# Patient Record
Sex: MALE | Race: WHITE
[De-identification: names, ages, dates, MRNs, and addresses within clinical notes are randomized per-mention and may not be internally consistent; named-entity substitution may affect disease eponyms.]

---

## 2017-05-02 ENCOUNTER — HOSPITAL ENCOUNTER (OUTPATIENT)
Dept: HOSPITAL 56 - MW.CHORTHO | Age: 72
End: 2017-05-02
Attending: PHYSICIAN ASSISTANT
Payer: MEDICARE

## 2017-05-02 DIAGNOSIS — M75.41: ICD-10-CM

## 2017-05-02 DIAGNOSIS — M75.42: Primary | ICD-10-CM

## 2017-05-02 PROCEDURE — G0463 HOSPITAL OUTPT CLINIC VISIT: HCPCS

## 2018-05-03 ENCOUNTER — HOSPITAL ENCOUNTER (INPATIENT)
Dept: HOSPITAL 56 - MW.MS | Age: 73
LOS: 2 days | Discharge: NURSING, REHAB OR LONG-TERM CARE | DRG: 683 | End: 2018-05-05
Attending: INTERNAL MEDICINE | Admitting: INTERNAL MEDICINE
Payer: MEDICARE

## 2018-05-03 DIAGNOSIS — Z88.5: ICD-10-CM

## 2018-05-03 DIAGNOSIS — I25.5: ICD-10-CM

## 2018-05-03 DIAGNOSIS — I25.10: ICD-10-CM

## 2018-05-03 DIAGNOSIS — R07.89: ICD-10-CM

## 2018-05-03 DIAGNOSIS — I48.2: ICD-10-CM

## 2018-05-03 DIAGNOSIS — R10.30: ICD-10-CM

## 2018-05-03 DIAGNOSIS — R60.9: ICD-10-CM

## 2018-05-03 DIAGNOSIS — Z79.899: ICD-10-CM

## 2018-05-03 DIAGNOSIS — E78.5: ICD-10-CM

## 2018-05-03 DIAGNOSIS — R79.89: ICD-10-CM

## 2018-05-03 DIAGNOSIS — I50.22: ICD-10-CM

## 2018-05-03 DIAGNOSIS — R06.00: ICD-10-CM

## 2018-05-03 DIAGNOSIS — R07.81: ICD-10-CM

## 2018-05-03 DIAGNOSIS — N17.9: Primary | ICD-10-CM

## 2018-05-03 DIAGNOSIS — I10: ICD-10-CM

## 2018-05-03 DIAGNOSIS — Z95.1: ICD-10-CM

## 2018-05-03 DIAGNOSIS — J90: ICD-10-CM

## 2018-05-03 DIAGNOSIS — I42.0: ICD-10-CM

## 2018-05-03 DIAGNOSIS — I44.7: ICD-10-CM

## 2018-05-03 DIAGNOSIS — K21.9: ICD-10-CM

## 2018-05-03 DIAGNOSIS — Z79.82: ICD-10-CM

## 2018-05-03 DIAGNOSIS — F41.8: ICD-10-CM

## 2018-05-03 LAB
CHLORIDE SERPL-SCNC: 99 MMOL/L (ref 98–107)
SODIUM SERPL-SCNC: 138 MMOL/L (ref 136–148)

## 2018-05-03 PROCEDURE — C1729 CATH, DRAINAGE: HCPCS

## 2018-05-03 RX ADMIN — POTASSIUM CHLORIDE SCH MEQ: 1500 TABLET, EXTENDED RELEASE ORAL at 18:19

## 2018-05-03 RX ADMIN — ONDANSETRON PRN MG: 2 INJECTION, SOLUTION INTRAMUSCULAR; INTRAVENOUS at 23:52

## 2018-05-03 RX ADMIN — POTASSIUM CHLORIDE SCH: 1500 TABLET, EXTENDED RELEASE ORAL at 21:44

## 2018-05-03 RX ADMIN — SODIUM CHLORIDE PRN MG: 9 INJECTION, SOLUTION INTRAVENOUS at 21:45

## 2018-05-03 NOTE — PCM.HP
H&P History of Present Illness





- General


Date of Service: 05/03/18


Admit Problem/Dx: 


 Admission Diagnosis/Problem





Admission Diagnosis/Problem      Acute kidney injury








Source of Information: Patient, Family (wife at bedside), Old Records (Dr Moon clinic records.)


History Limitations: Reports: No Limitations





- History of Present Illness


Initial Comments - Free Text/Narative: 





This 72 year old male with pmh of CAD, afib with LBBB, CABG 2/2018 with post 

CABG EF of 40%, systolic CHF and elevated RV pressures and large L sided 

pleural effusion was admitted directly from Dr Moon's clinic secondary 

to ELEONORA and elevated LFTs. He also reports L sided and mid sternal rib cage pain

, which has been present since CABG in February. It worsens with coughing and 

any movement of his trunk or palpation of his chest. Sharp in nature. The 

dyspnea has worsened over the last week and is now unable to walk short 

distances. He was switched from Lasix to Bumex due to ineffectiveness and some 

hearing changes. He feels very dry, with dry mouth and throat. He denies 

lightheadedness, dizziness or palpitations, no abdominal pain or troubles 

urinating. No black or bloody BMs. No fevers recently. Cough produces only 

clear phlegm.  





Labwork obtained 5/2 revealed BUN 22 and Cr 2.2, Na 143, K+ 2.9, , AST 

142 and Alk phos 235, . CXR obtained today 5/3 revealed moderate to 

large L sided pleural effusion, unchanged from 4/23/18.  





Upon arrival to Med/Surg HR ausculated in 150s. EKG obtained which revealed 

Afib rates 130 with LBBB. he denies palpitations or feeling of HR elevated. No 

dizziness. 





- Related Data


Allergies/Adverse Reactions: 


 Allergies











Allergy/AdvReac Type Severity Reaction Status Date / Time


 


No Known Allergies Allergy   Verified 05/03/18 17:55











Home Medications: 


 Home Meds





Aspirin 81 mg PO DAILY 05/03/18 [History]


ClonazePAM [KlonoPIN] 0.5 mg PO BEDTIME 05/03/18 [History]


Pantoprazole Sodium 40 mg PO DAILY 05/03/18 [History]


traMADol HCl [Tramadol HCl] 50 mg PO TID PRN 05/03/18 [History]











Past Medical History


Cardiovascular History: Reports: Afib, Bypass (2/2018), CAD, Heart Failure, 

High Cholesterol, Hypertension


Respiratory History: Reports: SOB (exertional)


Gastrointestinal History: Reports: GERD


Genitourinary History: Reports: None


Musculoskeletal History: Reports: Arthritis


Psychiatric History: Reports: Anxiety


Endocrine/Metabolic History: Denies: Diabetes, Type II


Hematologic History: Reports: Anticoagulation Therapy (Xarelto)





- Past Surgical History


Cardiovascular Surgical History: Reports: Cardiac Ablation, Coronary Artery 

Bypass





Social & Family History





- Tobacco Use


Smoking Status *Q: Never Smoker





- Alcohol Use


Alcohol Use History: No





- Recreational Drug Use


Recreational Drug Use: No





- Living Situation & Occupation


Living situation: Reports: 


Occupation: Retired





H&P Review of Systems





- Review of Systems:


Review Of Systems: See Below


General: Reports: Malaise, Fatigue.  Denies: Fever, Chills


HEENT: Reports: No Symptoms.  Denies: Headaches, Sinus Congestion, Sore Throat, 

Vertigo


Pulmonary: Reports: Shortness of Breath, Cough, Sputum (clear).  Denies: 

Hemoptysis


Cardiovascular: Reports: Chest Pain (with movement of trunk and coughing to 

bilateral rib cage), Dyspnea on Exertion, Edema (BLE).  Denies: Palpitations


Gastrointestinal: Reports: No Symptoms.  Denies: Abdominal Pain, Black Stool, 

Bloody Stool, Diarrhea, Nausea, Vomiting


Genitourinary: Reports: No Symptoms.  Denies: Dysuria, Frequency, Burning, Pain


Musculoskeletal: Reports: No Symptoms.  Denies: Neck Pain


Skin: Reports: No Symptoms.  Denies: Erythema


Psychiatric: Reports: Anxiety


Neurological: Reports: No Symptoms


Hematologic/Lymphatic: Reports: No Symptoms


Immunologic: Reports: No Symptoms





Exam





- Exam


Exam: See Below





- Exam


Quality Assessment: DVT Prophylaxis (SCDs only)


General: Alert, Oriented, Cooperative


HEENT: Conjunctiva Clear, Pupils Reactive.  No: Mucosa Moist & Pink (dry)


Neck: Supple, Trachea Midline, 2


Lungs: Decreased Breath Sounds (to L lower lobe)


Cardiovascular: Normal S1, Normal S2, Irregular Rhythm, Tachycardia (rates 150s)


GI/Abdominal Exam: Normal Bowel Sounds, Soft, Non-Tender, No Organomegaly, No 

Distention, No Abnormal Bruit, No Mass, Pelvis Stable


Extremities: Normal Inspection, Normal Range of Motion, Pedal Edema (+2-3 

pitting edema extending from bilateral hips to feet)


Skin: Other (well healed sternal incision and chest tube sites. this is where 

chest pain is located and hurts with palpation.)


Neurological: Cranial Nerves Intact


Neuro Extensive - Mental Status: Alert, Oriented x3, Normal Mood/Affect, Normal 

Cognition


Neuro Extensive - Motor, Sensory, Reflexes: CN II-XII Intact


Psychiatric: Alert, Anxious





- Patient Data


Result Diagrams: 


 05/03/18 17:00





 05/03/18 17:00





EKG INTERPRETATION


EKG Date: 05/03/18


Rhythm: A-Fib


Rate (Beats/Min): 130


QRS: LBBB


Comparison: No Change





*Q Meaningful Use (ADM)





- VTE Risk Assess *Q


Each Risk Factor Represents 1 Point: Congestive heart failure (CHF)


Total Score 1 Point Risk Factors: 1


Each Risk Factor Represents 2 Points: Age 60 - 74 Years


Total Score 2 Point Risk Factors: 2


Each Risk Factor Represents 3 Points: None


Total Score 3 Point Risk Factors: 0


Each Risk Factor Represents 5 Points: None


Total Score 5 Point Risk Factors: 0


Venous Thromboembolism Risk Factor Score *Q: 3





- Problem List


(1) ELEONORA (acute kidney injury)


SNOMED Code(s): 29240138


   ICD Code: N17.9 - ACUTE KIDNEY FAILURE, UNSPECIFIED   Status: Acute   

Current Visit: Yes   





(2) Chronic atrial fibrillation with RVR


SNOMED Code(s): 750537765, 451462924758413


   ICD Code: I48.2 - CHRONIC ATRIAL FIBRILLATION   Status: Acute   Current Visit

: Yes   





(3) Dyspnea


SNOMED Code(s): 393250085


   ICD Code: R06.00 - DYSPNEA, UNSPECIFIED   Status: Acute   Current Visit: Yes

   





(4) Pleural effusion


SNOMED Code(s): 01783170


   ICD Code: J90 - PLEURAL EFFUSION, NOT ELSEWHERE CLASSIFIED   Status: Acute   

Current Visit: Yes   





(5) Elevated LFTs


SNOMED Code(s): 633184636, 295622147


   ICD Code: R79.89 - OTHER SPECIFIED ABNORMAL FINDINGS OF BLOOD CHEMISTRY   

Status: Acute   Current Visit: Yes   





(6) Anxiety


SNOMED Code(s): 17363429


   ICD Code: F41.9 - ANXIETY DISORDER, UNSPECIFIED   Status: Chronic   Current 

Visit: Yes   





(7) CAD (coronary artery disease)


SNOMED Code(s): 10170277


   ICD Code: I25.10 - ATHSCL HEART DISEASE OF NATIVE CORONARY ARTERY W/O ANG 

PCTRS   Status: Chronic   Current Visit: Yes   





(8) Hx of CABG


SNOMED Code(s): 192559425, 718829397


   ICD Code: Z95.1 - PRESENCE OF AORTOCORONARY BYPASS GRAFT   Status: Chronic   

Current Visit: Yes   





(9) Depression


SNOMED Code(s): 22624943


   ICD Code: F32.9 - MAJOR DEPRESSIVE DISORDER, SINGLE EPISODE, UNSPECIFIED   

Status: Chronic   Current Visit: Yes   





(10) Chronic a-fib


SNOMED Code(s): 824770953


   ICD Code: I48.2 - CHRONIC ATRIAL FIBRILLATION   Status: Acute   Current Visit

: Yes   





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


SNOMED Code(s): 394200008


   ICD Code: I25.5 - ISCHEMIC CARDIOMYOPATHY; I42.0 - DILATED CARDIOMYOPATHY   

Status: Chronic   Current Visit: Yes   





(12) Musculoskeletal chest pain


SNOMED Code(s): 289745279


   ICD Code: R07.89 - OTHER CHEST PAIN   Status: Chronic   Current Visit: Yes   





(13) HTN (hypertension)


SNOMED Code(s): 67074198


   ICD Code: I10 - ESSENTIAL (PRIMARY) HYPERTENSION   Status: Chronic   Current 

Visit: Yes   


Qualifiers: 


   Hypertension type: essential hypertension   Qualified Code(s): I10 - 

Essential (primary) hypertension   





(14) CHF (congestive heart failure)


SNOMED Code(s): 28302415


   ICD Code: I50.9 - HEART FAILURE, UNSPECIFIED   Status: Chronic   Current 

Visit: Yes   


Qualifiers: 


   Heart failure type: systolic   Heart failure chronicity: chronic   Qualified 

Code(s): I50.22 - Chronic systolic (congestive) heart failure   


Problem List Initiated/Reviewed/Updated: Yes


Orders Last 24hrs: 


 Active Orders 24 hr











 Category Date Time Status


 


 Patient Status [ADT] Routine ADT  05/03/18 16:23 Ordered


 


 EKG 12 Lead [EKG Documentation Completion] [RC] STAT Care  05/03/18 17:01 

Ordered


 


 Height and Weight [RC] DAILY Care  05/03/18 16:23 Ordered


 


 Intake and Output Strict [RC] ASDIRECTED Care  05/03/18 16:32 Ordered


 


 Intake and Output [RC] QSHIFT Care  05/03/18 16:24 Inactive


 


 Oxygen Therapy [RC] PRN Care  05/03/18 16:23 Ordered


 


 RT Aerosol Therapy [RC] ASDIRECTED Care  05/03/18 16:30 Ordered


 


 Telemetry Monitoring [Cardiac Monitoring] [RC] .AS Care  05/03/18 17:02 Ordered





 DIRECTED   


 


 Up With Assistance [RC] ASDIRECTED Care  05/03/18 16:23 Ordered


 


 VTE/DVT Education [RC] PER UNIT ROUTINE Care  05/03/18 16:23 Ordered


 


 Vital Signs [RC] Q4H Care  05/03/18 16:23 Ordered


 


 2 Gram Sodium Diet [DIET] Diet  05/03/18 Dinner Ordered


 


 Chest wo Cont [CT] Urgent Exams  05/03/18 16:23 Ordered


 


 Echo Comp wo Cont [US] Routine Exams  05/03/18 16:23 Ordered


 


 Thoracentesis W/ US Guide [US] Routine Exams  05/03/18 17:03 Ordered


 


 CBC WITH AUTO DIFF [HEME] Routine Lab  05/03/18 16:23 Ordered


 


 COMPREHENSIVE METABOLIC PN,CMP [CHEM] Routine Lab  05/03/18 16:23 Ordered


 


 Albuterol [Proventil Neb Soln] Med  05/03/18 16:23 Ordered





 2.5 mg NEB Q2H PRN   


 


 Metoprolol Tartrate [Lopressor] Med  05/03/18 17:00 Ordered





 25 mg PO Q12HR   


 


 Ondansetron [Zofran] Med  05/03/18 16:23 Ordered





 4 mg IVPUSH Q4H PRN   


 


 Sodium Chloride 0.9% [Normal Saline] 1,000 ml Med  05/03/18 16:30 Ordered





 IV ASDIRECTED   


 


 Sodium Chloride 0.9% [Saline Flush] Med  05/03/18 16:23 Ordered





 2.5 ml FLUSH ASDIRECTED PRN   


 


 oxyCODONE Med  05/03/18 16:59 Ordered





 5 mg PO Q6H PRN   


 


 Saline Lock Insert [OM.PC] Routine Oth  05/03/18 16:23 Ordered


 


 Sequential Compression Device [OM.PC] Per Unit Routine Oth  05/03/18 16:24 

Ordered


 


 Resuscitation Status Routine Resus Stat  05/03/18 17:03 Ordered








 Medication Orders





Albuterol (Proventil Neb Soln)  2.5 mg NEB Q2H PRN


   PRN Reason: Shortness Of Breath/wheezing


Sodium Chloride (Normal Saline)  1,000 mls @ 75 mls/hr IV ASDIRECTED SHIRA


Metoprolol Tartrate (Lopressor)  25 mg PO Q12HR SHIRA


Ondansetron HCl (Zofran)  4 mg IVPUSH Q4H PRN


   PRN Reason: Nausea


Oxycodone HCl (Oxycodone)  5 mg PO Q6H PRN


   PRN Reason: Pain


Sodium Chloride (Saline Flush)  2.5 ml FLUSH ASDIRECTED PRN


   PRN Reason: Keep Vein Open








Assessment/Plan Comment:: 





This 72 year old male admitted with ELEONORA secondary to diuretic use with pmh of 

CAD, CHF, Afib, and L sided pleural effusion





1. ELEONORA: likely secondary to over diuresis. Will hold Bumex. Gentle hydration 

with NS 75. BUN 22 Cr 2.4, baseline near 1.3.





2. Chronic Afib with RVR: HR on admission elevated 130-150s, asymptomatic. 

Spoke with Dr Moon. Recommended Lopressor, wife reports he had 

reaction to this and it was stopped, unknown reaction, will check records at 

[place]. Consulted with Dr NADINE lewis, Ok to give Diltiazem 10 mg IV PRN and 

recommended to start Coreg 3.125 mg BID tonight. Monitor on telemetry. K+ 2.6, 

will give 40 po now and 40 IV in 500 ml NS this evening, Magnesium pending. 

Holding Xarelto due to elevated LFTs. HR improved to 80s after IV Diltiazem. 





3. CHF: EF 40% post CABG. Will obtain new ECHO during admission. Continues to 

have BLE edema, but very intravascularly dry. 





4. Dyspnea/L sided pleural effusion: Will have thoracentesis tomorrow. Chest CT 

without contrast this evening, pending. 





5. Elevated LFTs: Hold Crestor and Xarelto for now. Will monitor daily. 

, , Bilirubin 1.2 and Alk phos 240. 





6. Anxiety/Depression: Continue Clonazepam. Monitor





7. CAD: Stable: ECHO pending. Holding Statin due to elevated LFTs. Hx CABG. 

Will monitor.





8. Musculoskeletal chest pain: Secondary to CABG, reports Tramadol not working 

at home. Unable to give NSAIDs or Tylenol. Will trial Oxycodone 5 mg and 

monitor. patient and wife request NO Morphine.





VTE prophylaxis: Holding Xarelto. SCDs for now. 





Dispo: 2-3 days pending improvement.

## 2018-05-04 LAB
CHLORIDE SERPL-SCNC: 102 MMOL/L (ref 98–107)
SODIUM SERPL-SCNC: 139 MMOL/L (ref 136–148)

## 2018-05-04 PROCEDURE — 0W9B3ZZ DRAINAGE OF LEFT PLEURAL CAVITY, PERCUTANEOUS APPROACH: ICD-10-PCS

## 2018-05-04 RX ADMIN — POTASSIUM CHLORIDE SCH MEQ: 1500 TABLET, EXTENDED RELEASE ORAL at 09:09

## 2018-05-04 RX ADMIN — ONDANSETRON PRN MG: 2 INJECTION, SOLUTION INTRAMUSCULAR; INTRAVENOUS at 09:38

## 2018-05-04 RX ADMIN — ONDANSETRON PRN MG: 2 INJECTION, SOLUTION INTRAMUSCULAR; INTRAVENOUS at 18:10

## 2018-05-04 RX ADMIN — POTASSIUM CHLORIDE SCH MEQ: 1500 TABLET, EXTENDED RELEASE ORAL at 20:11

## 2018-05-04 RX ADMIN — ONDANSETRON PRN MG: 2 INJECTION, SOLUTION INTRAMUSCULAR; INTRAVENOUS at 05:30

## 2018-05-04 NOTE — US
EXAMINATION: Ultrasound guided left thoracentesis.

 

HISTORY: Left pleural effusion. 

 

Technique/findings:  The procedure, benefits and risks were discussed with the patient. Risks include
d serious bleeding, infection and pain. An adequate location was located within the left posterior lo
wer thorax. The overlying area was sterilely prepped and draped. 1% lidocaine was administered for lo
elan anesthesia. Using guidance and care to place a needle on the superior aspect of the adjacent rib 
a 5 Faro[place] one-step needle was advanced until fluid return was noted. Approximately 1000 mL of bloody
 fluid was collected. The patient did have a considerable amount of coughing following the procedure.
 Which did somewhat improved from before returning to the floor.

 

 

IMPRESSION: Successful ultrasound guided left thoracentesis. Fluid was bloody.

## 2018-05-04 NOTE — PCM.PN
- General Info


Date of Service: 05/04/18


Admission Dx/Problem (Free Text): 


 Admission Diagnosis/Problem





Admission Diagnosis/Problem      Acute kidney injury








Subjective Update: 





Feeling "so-so" this morning. musculoskeletal chest pain is better controlled 

with Oxycodone. Coughing has decreased slightly since pain controlled better as 

well. Feeling a little more hydrated this morning, mouth is less dry.  


Functional Status: Reports: Pain Controlled, Tolerating Diet, Urinating





- Review of Systems


General: Reports: Malaise


HEENT: Denies: Headaches, Sore Throat, Visual Changes


Pulmonary: Reports: Shortness of Breath, Cough, Sputum (clear).  Denies: 

Wheezing


Cardiovascular: Reports: Chest Pain (musculoskeletal), Edema


Gastrointestinal: Reports: Decreased Appetite, Nausea (intermittent).  Denies: 

Abdominal Pain, Vomiting


Genitourinary: Reports: No Symptoms.  Denies: Dysuria, Frequency


Musculoskeletal: Reports: No Symptoms.  Denies: Neck Pain


Skin: Reports: No Symptoms


Neurological: Reports: No Symptoms


Psychiatric: Reports: Anxiety





- Patient Data


Vitals - Most Recent: 


 Last Vital Signs











Temp  98.7 F   05/04/18 04:00


 


Pulse  95   05/04/18 04:00


 


Resp  22 H  05/04/18 04:00


 


BP  127/90   05/04/18 04:00


 


Pulse Ox  95   05/04/18 04:00











Weight - Most Recent: 69 kg


I&O - Last 24 Hours: 


 Intake & Output











 05/03/18 05/04/18 05/04/18





 22:59 06:59 14:59


 


Intake Total  790 


 


Output Total  150 


 


Balance  640 











Lab Results Last 24 Hours: 


 Laboratory Results - last 24 hr











  05/03/18 05/03/18 05/03/18 Range/Units





  17:00 17:00 17:00 


 


WBC  10.44    (4.0-11.0)  K/uL


 


RBC  4.75    (4.50-5.90)  M/uL


 


Hgb  13.8    (13.0-17.0)  g/dL


 


Hct  42.4    (38.0-50.0)  %


 


MCV  89.3    (80.0-98.0)  fL


 


MCH  29.1    (27.0-32.0)  pg


 


MCHC  32.5    (31.0-37.0)  g/dL


 


RDW Std Deviation  55.2    (28.0-62.0)  fl


 


RDW Coeff of Aj  17 H    (11.0-15.0)  %


 


Plt Count  220    (150-400)  K/uL


 


MPV  9.60    (7.40-12.00)  fL


 


Neut % (Auto)  70.2    (48.0-80.0)  %


 


Lymph % (Auto)  15.0 L    (16.0-40.0)  %


 


Mono % (Auto)  14.5    (0.0-15.0)  %


 


Eos % (Auto)  0.1    (0.0-7.0)  %


 


Baso % (Auto)  0.2    (0.0-1.5)  %


 


Neut # (Auto)  7.3 H    (1.4-5.7)  K/uL


 


Lymph # (Auto)  1.6    (0.6-2.4)  K/uL


 


Mono # (Auto)  1.5 H    (0.0-0.8)  K/uL


 


Eos # (Auto)  0.0    (0.0-0.7)  K/uL


 


Baso # (Auto)  0.0    (0.0-0.1)  K/uL


 


Add Manual Diff     


 


Neutrophils % (Manual)     (48.0-80.0)  %


 


Lymphocytes % (Manual)     (16.0-40.0)  %


 


Monocytes % (Manual)     (0.0-15.0)  %


 


Basophils % (Manual)     (0.0-1.5)  %


 


Nucleated RBC %  0.0    /100WBC


 


Absolute Seg Neuts     (1.4-5.7)  


 


Lymphocytes # (Manual)     (0.6-2.4)  


 


Monocytes # (Manual)     (0.0-0.8)  


 


Basophils # (Manual)     (0.0-0.1)  


 


Nucleated RBCs #  0    K/uL


 


Sodium   138   (136-148)  mmol/L


 


Potassium   2.6 L   (3.5-5.1)  mmol/L


 


Chloride   99   ()  mmol/L


 


Carbon Dioxide   23.5   (21.0-32.0)  mmol/L


 


BUN   22 H   (7.0-18.0)  mg/dL


 


Creatinine   2.4 H   (0.8-1.3)  mg/dL


 


Est Cr Clr Drug Dosing   TNP   


 


Estimated GFR (MDRD)   26.7   ml/min


 


Glucose   110 H   ()  mg/dL


 


Calcium   9.4   (8.5-10.1)  mg/dL


 


Magnesium    1.6  (1.5-2.0)  mg/dL


 


Total Bilirubin   1.2 H   (0.2-1.0)  mg/dL


 


AST   266 H   (15-37)  IU/L


 


ALT   211 H   (14-63)  IU/L


 


Alkaline Phosphatase   240 H   ()  U/L


 


Total Protein   7.1   (6.4-8.2)  g/dL


 


Albumin   3.2 L   (3.4-5.0)  g/dL


 


Globulin   3.9 H   (2.0-3.5)  g/dL


 


Albumin/Globulin Ratio   0.8 L   (1.3-2.8)  














  05/04/18 05/04/18 Range/Units





  05:05 05:05 


 


WBC  8.73   (4.0-11.0)  K/uL


 


RBC  4.59   (4.50-5.90)  M/uL


 


Hgb  13.4   (13.0-17.0)  g/dL


 


Hct  41.5   (38.0-50.0)  %


 


MCV  90.4   (80.0-98.0)  fL


 


MCH  29.2   (27.0-32.0)  pg


 


MCHC  32.3   (31.0-37.0)  g/dL


 


RDW Std Deviation  55.8   (28.0-62.0)  fl


 


RDW Coeff of Aj  17 H   (11.0-15.0)  %


 


Plt Count  197   (150-400)  K/uL


 


MPV  9.80   (7.40-12.00)  fL


 


Neut % (Auto)    (48.0-80.0)  %


 


Lymph % (Auto)    (16.0-40.0)  %


 


Mono % (Auto)    (0.0-15.0)  %


 


Eos % (Auto)    (0.0-7.0)  %


 


Baso % (Auto)    (0.0-1.5)  %


 


Neut # (Auto)    (1.4-5.7)  K/uL


 


Lymph # (Auto)    (0.6-2.4)  K/uL


 


Mono # (Auto)    (0.0-0.8)  K/uL


 


Eos # (Auto)    (0.0-0.7)  K/uL


 


Baso # (Auto)    (0.0-0.1)  K/uL


 


Add Manual Diff  YES   


 


Neutrophils % (Manual)  61   (48.0-80.0)  %


 


Lymphocytes % (Manual)  22   (16.0-40.0)  %


 


Monocytes % (Manual)  16 H   (0.0-15.0)  %


 


Basophils % (Manual)  1   (0.0-1.5)  %


 


Nucleated RBC %  0.0   /100WBC


 


Absolute Seg Neuts  5.3   (1.4-5.7)  


 


Lymphocytes # (Manual)  1.9   (0.6-2.4)  


 


Monocytes # (Manual)  1.4 H   (0.0-0.8)  


 


Basophils # (Manual)  0.1   (0.0-0.1)  


 


Nucleated RBCs #  0   K/uL


 


Sodium   139  (136-148)  mmol/L


 


Potassium   3.7  (3.5-5.1)  mmol/L


 


Chloride   102  ()  mmol/L


 


Carbon Dioxide   25.5  (21.0-32.0)  mmol/L


 


BUN   26 H  (7.0-18.0)  mg/dL


 


Creatinine   2.4 H  (0.8-1.3)  mg/dL


 


Est Cr Clr Drug Dosing   23.30  


 


Estimated GFR (MDRD)   26.7  ml/min


 


Glucose   111 H  ()  mg/dL


 


Calcium   9.0  (8.5-10.1)  mg/dL


 


Magnesium   2.4 H  (1.5-2.0)  mg/dL


 


Total Bilirubin   1.1 H  (0.2-1.0)  mg/dL


 


AST   483 H  (15-37)  IU/L


 


ALT   331 H  (14-63)  IU/L


 


Alkaline Phosphatase   205 H  ()  U/L


 


Total Protein   6.4  (6.4-8.2)  g/dL


 


Albumin   2.8 L  (3.4-5.0)  g/dL


 


Globulin   3.6 H  (2.0-3.5)  g/dL


 


Albumin/Globulin Ratio   0.8 L  (1.3-2.8)  











Med Orders - Current: 


 Current Medications





Albuterol (Proventil Neb Soln)  2.5 mg NEB Q2H PRN


   PRN Reason: Shortness Of Breath/wheezing


Carvedilol (Coreg)  3.125 mg PO BID [facility]


   Last Admin: 05/03/18 21:44 Dose:  3.125 mg


Clonazepam (Klonopin)  0.5 mg PO BEDTIME [facility]IR[facility]


   Last Admin: 05/03/18 21:44 Dose:  0.5 mg


Diltiazem HCl (Diltiazem)  10 mg IVPUSH Q3H PRN


   PRN Reason: afib HR>110


   Last Admin: 05/03/18 21:45 Dose:  10 mg


Sodium Chloride (Normal Saline)  1,000 mls @ 75 mls/hr IV ASDIRECTED [facility]


   Last Admin: 05/04/18 05:12 Dose:  75 mls/hr


Ondansetron HCl (Zofran)  4 mg IVPUSH Q4H PRN


   PRN Reason: Nausea


   Last Admin: 05/04/18 05:30 Dose:  4 mg


Oxycodone HCl (Oxycodone)  5 mg PO Q6H PRN


   PRN Reason: Pain


   Last Admin: 05/04/18 05:32 Dose:  5 mg


Pantoprazole Sodium (Protonix***)  40 mg PO DAILY@0700 [facility]


   Last Admin: 05/04/18 06:54 Dose:  40 mg


Potassium Chloride (Klor-Con M20)  40 meq PO BID [facility]


   Last Admin: 05/03/18 21:44 Dose:  Not Given


Sodium Chloride (Saline Flush)  2.5 ml FLUSH ASDIRECTED PRN


   PRN Reason: Keep Vein Open





Discontinued Medications





Diltiazem HCl (Diltiazem)  10 mg IVPUSH ONETIME ONE


   Stop: 05/03/18 17:19


   Last Admin: 05/03/18 17:38 Dose:  10 mg


Diltiazem HCl (Diltiazem)  10 mg IVPUSH Q6H PRN


   PRN Reason: afib HR>110


Potassium Chloride 40 meq/ (Sodium Chloride)  520 mls @ 100 mls/hr IV .ONETIME 

[facility]


   Last Admin: 05/03/18 18:20 Dose:  100 mls/hr


Magnesium Sulfate 2 gm/ Premix  50 mls @ 50 mls/hr IV ONETIME ONE


   Stop: 05/03/18 19:08


   Last Admin: 05/03/18 18:21 Dose:  50 mls/hr


Metoprolol Tartrate (Lopressor)  5 mg IVPUSH STAT ONE


   Stop: 05/03/18 16:59


   Last Admin: 05/03/18 17:38 Dose:  Not Given


Metoprolol Tartrate (Lopressor)  25 mg PO Q12HR [facility]


   Last Admin: 05/03/18 17:39 Dose:  Not Given











- Exam


General: Alert, Oriented, Cooperative, No Acute Distress


Lungs: Clear to Auscultation, Normal Respiratory Effort


Cardiovascular: Regular Rate, Irregular Rhythm


GI/Abdominal Exam: Normal Bowel Sounds, Soft, Non-Tender, No Organomegaly, No 

Distention, No Abnormal Bruit, No Mass, Pelvis Stable


Back Exam: Normal Inspection, Full Range of Motion


Extremities: Normal Inspection, Normal Range of Motion, Non-Tender, Normal 

Capillary Refill, Pedal Edema (+3 pitting edema to BLE extending up to 

bilateral hips)


Neurological: No New Focal Deficit


Psy/Mental Status: Alert, Normal Mood, Anxious (intermittently)





- Problem List & Annotations


(1) ELEONORA (acute kidney injury)


SNOMED Code(s): 80168035


   Code(s): N17.9 - ACUTE KIDNEY FAILURE, UNSPECIFIED   Status: Acute   Current 

Visit: Yes   





(2) Chronic atrial fibrillation with RVR


SNOMED Code(s): 735969644, 456159132460461


   Code(s): I48.2 - CHRONIC ATRIAL FIBRILLATION   Status: Acute   Current Visit

: Yes   





(3) Dyspnea


SNOMED Code(s): 377800936


   Code(s): R06.00 - DYSPNEA, UNSPECIFIED   Status: Acute   Current Visit: Yes 

  





(4) Pleural effusion


SNOMED Code(s): 98954070


   Code(s): J90 - PLEURAL EFFUSION, NOT ELSEWHERE CLASSIFIED   Status: Acute   

Current Visit: Yes   





(5) Elevated LFTs


SNOMED Code(s): 923885625, 494717158


   Code(s): R79.89 - OTHER SPECIFIED ABNORMAL FINDINGS OF BLOOD CHEMISTRY   

Status: Acute   Current Visit: Yes   





(6) Anxiety


SNOMED Code(s): 25607170


   Code(s): F41.9 - ANXIETY DISORDER, UNSPECIFIED   Status: Chronic   Current 

Visit: Yes   





(7) CAD (coronary artery disease)


SNOMED Code(s): 02023685


   Code(s): I25.10 - ATHSCL HEART DISEASE OF NATIVE CORONARY ARTERY W/O ANG 

PCTRS   Status: Chronic   Current Visit: Yes   





(8) Hx of CABG


SNOMED Code(s): 146371785, 835306335


   Code(s): Z95.1 - PRESENCE OF AORTOCORONARY BYPASS GRAFT   Status: Chronic   

Current Visit: Yes   





(9) Depression


SNOMED Code(s): 37449253


   Code(s): F32.9 - MAJOR DEPRESSIVE DISORDER, SINGLE EPISODE, UNSPECIFIED   

Status: Chronic   Current Visit: Yes   





(10) Chronic a-fib


SNOMED Code(s): 434239328


   Code(s): I48.2 - CHRONIC ATRIAL FIBRILLATION   Status: Acute   Current Visit

: Yes   





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


SNOMED Code(s): 053784065


   Code(s): I25.5 - ISCHEMIC CARDIOMYOPATHY; I42.0 - DILATED CARDIOMYOPATHY   

Status: Chronic   Current Visit: Yes   





(12) Musculoskeletal chest pain


SNOMED Code(s): 830355986


   Code(s): R07.89 - OTHER CHEST PAIN   Status: Chronic   Current Visit: Yes   





(13) HTN (hypertension)


SNOMED Code(s): 06685734


   Code(s): I10 - ESSENTIAL (PRIMARY) HYPERTENSION   Status: Chronic   Current 

Visit: Yes   


Qualifiers: 


   Hypertension type: essential hypertension   Qualified Code(s): I10 - 

Essential (primary) hypertension   





(14) CHF (congestive heart failure)


SNOMED Code(s): 05643610


   Code(s): I50.9 - HEART FAILURE, UNSPECIFIED   Status: Chronic   Current Visit

: Yes   


Qualifiers: 


   Heart failure type: systolic   Heart failure chronicity: chronic   Qualified 

Code(s): I50.22 - Chronic systolic (congestive) heart failure   





- Problem List Review


Problem List Initiated/Reviewed/Updated: Yes





- My Orders


Last 24 Hours: 


My Active Orders





05/03/18 16:23


Patient Status [ADT] Routine 


Height and Weight [RC] DAILY 


Oxygen Therapy [RC] PRN 


Up With Assistance [RC] ASDIRECTED 


Vital Signs [RC] Q4H 


Chest wo Cont [CT] Urgent 


Albuterol [Proventil Neb Soln]   2.5 mg NEB Q2H PRN 


Ondansetron [Zofran]   4 mg IVPUSH Q4H PRN 


Sodium Chloride 0.9% [Saline Flush]   2.5 ml FLUSH ASDIRECTED PRN 


Saline Lock Insert [OM.PC] Routine 





05/03/18 16:24


Intake and Output [RC] QSHIFT 


Sequential Compression Device [OM.PC] Per Unit Routine 





05/03/18 16:30


RT Aerosol Therapy [RC] ASDIRECTED 


Sodium Chloride 0.9% [Normal Saline] 1,000 ml IV ASDIRECTED 





05/03/18 16:32


Intake and Output Strict [RC] ASDIRECTED 





05/03/18 16:59


oxyCODONE   5 mg PO Q6H PRN 





05/03/18 17:02


Telemetry Monitoring [Cardiac Monitoring] [RC] Q8H 





05/03/18 17:03


Resuscitation Status Routine 





05/03/18 18:11


BERENICE Hose [Antiembolic Hose] [OM.PC] Routine 





05/03/18 18:15


Potassium Chloride [Klor-Con M20]   40 meq PO BID 





05/03/18 21:00


Carvedilol [Coreg]   3.125 mg PO BID 


ClonazePAM [KlonoPIN]   0.5 mg PO BEDTIME 





05/03/18 Dinner


2 Gram Sodium Diet [DIET] 





05/04/18


Thoracentesis W/ US Guide [US] Routine 





05/04/18 07:00


Pantoprazole [ProTONIX***]   40 mg PO DAILY@0700 





05/04/18 08:05


Communication Order [RC] PRN 





05/04/18 16:23


Echo Comp wo Cont [US] Routine 





05/05/18 05:11


CBC WITH AUTO DIFF [HEME] AM 


COMPREHENSIVE METABOLIC PN,CMP [CHEM] AM 


MG [MAGNESIUM] [CHEM] AM 





05/06/18 05:11


CBC WITH AUTO DIFF [HEME] AM 


COMPREHENSIVE METABOLIC PN,CMP [CHEM] AM 


MG [MAGNESIUM] [CHEM] AM 





05/07/18 05:11


CBC WITH AUTO DIFF [HEME] AM 


COMPREHENSIVE METABOLIC PN,CMP [CHEM] AM 


MG [MAGNESIUM] [CHEM] AM 














- Plan


Plan:: 





This 72 year old male admitted with ELEONORA secondary to diuretic use with pmh of 

CAD, CHF, Afib, and L sided pleural effusion





1. ELEONORA: possibly due to over diuresis. Will hold Bumex. Gentle hydration with 

NS 75. BUN 26 Cr 2.4. Continue to monitor. 





2. Chronic Afib with RVR: HR improved slightly with PRN Diltiazem, Coreg 3.125 

mg BID started per Dr Moon. HR overnight 90-110s, afib. Continue on 

telemetry. K+ 3.7 this morning. Mg 2.4. Holding Xarelto due to elevated LFTs. 





3. CHF: EF 40% post CABG. ECHO this morning with Dr FERRER await to hear results 

and recommendations. Continues to have severe BLE edema





4. Dyspnea/L sided pleural effusion: Will have thoracentesis today. Chest CT 

without contrast this evening, pending. 





5. Elevated LFTs: Hold Crestor and Xarelto for now. Will monitor daily. 

Increased since yesterday , , Bilirubin 1.1 and Alk phos 205. 





6. Anxiety/Depression: Continue Clonazepam. Monitor





7. CAD: Stable: ECHO pending. Holding Statin due to elevated LFTs. Hx CABG. 

Will monitor.





8. Musculoskeletal chest pain: Secondary to CABG, reports Tramadol not working 

at home. Unable to give NSAIDs or Tylenol. Will trial Oxycodone 5 mg and 

monitor. patient and wife request NO Morphine.





VTE prophylaxis: Holding Xarelto. SCDs for now. 





Dispo: 2-3 days pending improvement. 











Spoke with PCP, Dr Lazo, this morning. Discussed past labwork, April 3rd LFTs 

normal at this time along with renal function, BUN 8 and Cr 0.8. He was able to 

find in Dr. Mckeon records that Metoprolol was stopped November 2017 due to 

worsening depression. Verapamil 180 was started but they considered stopping 

this as well due to worsening peripheral edema. Verapamil was stopped recently 

per Dr Moon's clinic notes.

## 2018-05-05 LAB
CHLORIDE SERPL-SCNC: 103 MMOL/L (ref 98–107)
SODIUM SERPL-SCNC: 137 MMOL/L (ref 136–148)

## 2018-05-05 RX ADMIN — SODIUM CHLORIDE PRN MG: 9 INJECTION, SOLUTION INTRAVENOUS at 14:00

## 2018-05-05 RX ADMIN — SODIUM CHLORIDE PRN MG: 9 INJECTION, SOLUTION INTRAVENOUS at 00:41

## 2018-05-05 RX ADMIN — POTASSIUM CHLORIDE SCH: 1500 TABLET, EXTENDED RELEASE ORAL at 09:21

## 2018-05-05 NOTE — CONS
DATE OF CONSULTATION:

 

 

YOB: 1945

 

PRIMARY CARE PHYSICIAN:

MARIA C SWAIN MD

 

REASON FOR CONSULTATION:

Heart failure, elevation in renal function, elevation in liver function.

 

HISTORY:

This is a 72-year-old male who has a history of hypertension, hyperlipidemia,

family history of CAD, permanent atrial fibrillation, and also with left bundle-

branch block pattern.  He was initially seeing Dr. Burrell for exertional shortness

of breath without chest pain and abnormal EKG, subsequently he underwent an

exercise testing showing ischemia along with anterior and apical and septal

wall.  Then, he underwent a coronary angiogram, this showed double-vessel

disease including LAD and rami, and then he was recommended to have a CABG done

which he underwent in February 2018.  At that time, he had LIMA to LAD, SVG to

rami as well as to diagonal and to proximal LAD.  At that time, ejection

fraction was about 50%.  However, his ejection fraction after the CABG is noted

to be 40% with elevated RVSP.  While he was admitted for CABG, he also had

abnormal kidney function and liver function and apparently his kidney function

and liver function became normalized and he was discharged home.  When I saw him

in the clinic, the reason for seeing him in the clinic due to worsening leg

swelling as well as the left pleural effusion.  He was noted to have the left

pleural effusion with moderate-to-large size, and he underwent ultrasound-guided

thoracentesis in April 2018, and then he went back home. When I saw him in the

clinic, I repeated the chest x-ray and showed that his left pleural effusion has

come back.  The patient also noted that like he has a side effect from Lasix 80

mg with a side effect of hearing loss and that was the reason the Lasix was

changed.  He was at one time put on the Breo as well as prednisone due to

excessive coughing and after he completed prednisone, his coughing seemed to be

better.  However, he still had some swelling in his legs that was the reason the

Bumex 1 mg was started.  When I followed up in a week's period, after Bumex was

started, he has lost weight from 157 to 148.  However, the repeat labs show

elevation of creatinine from 1.3 to 2.2 as well as elevation of liver function

tests which show  and .  That was the reason that other

medications have been on hold including famotidine, Xarelto, Crestor, Bumex, and

citalopram.  He took Xarelto, last dose on May 2nd in the morning and also

taking aspirin.  His swelling on his left arm came down a lot, however, the leg

swelling remain unchanged.  He still has irritating cough, and chest x-ray shows

recurrent persistent left pleural effusion in the large size.  Then I

recommended him to be admitted to hospital for possible pleural tapping as well

as a gentle hydration.

 

PAST MEDICAL HISTORY:

Including;

1. CAD, status post CABG with LV systolic dysfunction, decompensated heart

    failure, persistent left pleural effusion.

2. Hypertension.

3. Hyperlipidemia.

4. Permanent atrial fibrillation.

5. Chronic left bundle-branch block pattern.

6. LV systolic dysfunction, possibly 35%.

 

SOCIAL HISTORY:

Denies smoking, drug use or alcohol consumption.

 

REVIEW OF SYSTEMS:

Positive for chest pain, shortness of breath and some abdominal pain, leg

swelling, arm swelling, weakness, coughing.

 

ALLERGIES:

He is allergic to morphine.

 

CURRENT MEDICATIONS:

Only including aspirin, clonazepam, pantoprazole, tramadol.

 

PHYSICAL EXAMINATION:

HEENT:  Mildly pale.  He looks dry.  JVD positive.

LUNGS:  Decreased breath sounds on the left side and some crackle on the right

side.  No wheezing.

HEART:  Normal S1 and S2.  Totally irregular.  No murmur.

ABDOMEN:  Soft, mildly tender.  No rebound tenderness.  No guarding.  Bowel

sounds present.

LEGS:  Edema bilaterally.

 

LABORATORY INVESTIGATION:

CBC showed WBC 10, hematocrit of 42, hemoglobin of 13, platelets 220.  Sodium

138, potassium 2.6, chloride 99, bicarb 23, BUN 22, creatinine 2.4, glucose 110,

calcium 9.4, magnesium 1.6.  Total bilirubin 1.2, , .

 

Chest x-ray showed persistent left pleural effusion, large in size.  EKG shows

complete left bundle with permanent atrial fibrillation.

 

ASSESSMENT AND PLAN:

This is a 72-year-old male, hypertension, hyperlipidemia, permanent atrial

fibrillation, complete left bundle-branch block pattern, CAD, status post CABG,

ejection fraction of 35-40%, in minute, recurrent persistent large left pleural

effusion with elevation of liver function tests and creatinine.  He will be

undergoing the left pleural tapping with ultrasound guided; however, he took

Xarelto on May 2nd.  He talked to the radiologist, decided on when would be the

right time to do the tapping.  I personally think he has lost weight as well as

his swelling has come down.  He is on the dry side, that may be the reason why

the creatinine is rising.  However, his liver function is elevated, probably

from his heart failure, so he would need gentle hydration, follow up with

creatinine closely and also follow up his urine output as well, and I have

talked to Dr. Burrell.  He agrees with the plan.  However, if he clinically becomes

deteriorating, in other words, urine output is very poor or rising liver

function tests or the kidney function, he would need a higher level of care.  He

would need to be transferred to the other facility for close monitoring and

intensive monitoring as well as possible inotrope, possible nephrology

consultation.

 

 

KENDRA VUONG

DD:  05/05/2018 11:38:05

DT:  05/05/2018 12:42:01

Job #:  [id number]

## 2018-05-05 NOTE — [id number]
**Discharge Summary





- Discharge Data


Discharge Date: 05/05/18


Discharge Disposition: DC/Tfer to Acute Hospital [facility]


Condition: Good





- Patient Summary/Data


Consults: 


 Consultations





05/04/18 09:00


Consult to Physician [CONS] Routine 











Hospital Course: 





Hospital diagnosis:


CHF exacerbation


CAD s/p CABG in 2/2018


Acute kidney Failure


UTI


Elevated LFTs


suprapubic pain with history kidney stones





Hospital Course: This 72 year old male with pmh of CAD, afib with LBBB, CABG 2/ 2018 with post CABG EF of 40%, systolic CHF and elevated RV pressures.  Who was 

admitted directly from Dr Moon's clinic secondary to ELEONORA and elevated 

LFTs. He also reports L sided and mid sternal rib cage pain, which has been 

present since CABG in February. His dyspnea has worsened over the last week and 

is now unable to walk short distances. He was recently switched from Lasix to 

Bumex due to ineffectiveness and some hearing changes.  He was felt to be dry 

by exam on admission. Labwork obtained 5/2 revealed BUN 22 and Cr 2.2, Na 143, K

+ 2.9, ,  and Alk phos 235, . CXR 5/3 revealed moderate to 

large L sided pleural effusion, unchanged from 4/23/18.  Upon arrival to Med/

Surg HR ausculated in 150s. EKG obtained which revealed Afib rates 130 with 

LBBB.  He was given IV lopressor and started on coreg with good control of his 

rate. He was started on gently hydration with normal saline due to his acute 

kidney failure which was felt to be due to over diuresis.   He had a theraputic 

thoracentesis in which 1000 mls of pleural fluid was drained.  On his third 

hospital day his creatinine zachery to 3.1 and AST to 12,880.  He was placed on 

Rocephin and vancomycin due to concerns of UTI with +2 bacteria in UA.  Today 

he complains of suprapubic pain.  He does have history of kidney stones.  I am 

not able to get any images here as our Radiology department is down due to 

technical issues with .  Due to his worsening kidney and heart disease Dr Amin and I recommended transferring to [place].  I spoke with the ER 

physician in [place] who has accepted the patient.  Ground transportation is 

being arrange but family wants to wait until this afternoon to transfer to 

allow her daughter to arrive.





- Discharge Plan


Home Medications: 


 Home Meds





Aspirin 81 mg PO DAILY 05/03/18 [History]


ClonazePAM [KlonoPIN] 0.5 mg PO BEDTIME 05/03/18 [History]


Pantoprazole Sodium 40 mg PO DAILY 05/03/18 [History]


traMADol HCl [Tramadol HCl] 50 mg PO TID PRN 05/03/18 [History]











- Patient Data


Vitals - Most Recent: 


 Last Vital Signs











Temp  36.3 C   05/05/18 08:00


 


Pulse  101 H  05/05/18 08:45


 


Resp  16   05/05/18 08:00


 


BP  130/94 H  05/05/18 08:45


 


Pulse Ox  93 L  05/05/18 08:00











Weight - Most Recent: 69.5 kg


I&O - Last 24 hours: 


 Intake & Output











 05/04/18 05/05/18 05/05/18





 22:59 06:59 14:59


 


Intake Total 1413 300 


 


Output Total 0 100 


 


Balance 1413 200 











Lab Results - Last 24 hrs: 


 Laboratory Results - last 24 hr











  05/04/18 05/05/18 05/05/18 Range/Units





  19:40 06:53 06:53 


 


WBC   13.57 H   (4.0-11.0)  K/uL


 


RBC   4.84   (4.50-5.90)  M/uL


 


Hgb   14.2   (13.0-17.0)  g/dL


 


Hct   43.7   (38.0-50.0)  %


 


MCV   90.3   (80.0-98.0)  fL


 


MCH   29.3   (27.0-32.0)  pg


 


MCHC   32.5   (31.0-37.0)  g/dL


 


RDW Std Deviation   56.6   (28.0-62.0)  fl


 


RDW Coeff of Aj   17 H   (11.0-15.0)  %


 


Plt Count   199   (150-400)  K/uL


 


MPV   9.90   (7.40-12.00)  fL


 


Neut % (Auto)   76.5   (48.0-80.0)  %


 


Lymph % (Auto)   10.8 L   (16.0-40.0)  %


 


Mono % (Auto)   12.6   (0.0-15.0)  %


 


Eos % (Auto)   0.0   (0.0-7.0)  %


 


Baso % (Auto)   0.1   (0.0-1.5)  %


 


Neut # (Auto)   10.4 H   (1.4-5.7)  K/uL


 


Lymph # (Auto)   1.5   (0.6-2.4)  K/uL


 


Mono # (Auto)   1.7 H   (0.0-0.8)  K/uL


 


Eos # (Auto)   0.0   (0.0-0.7)  K/uL


 


Baso # (Auto)   0.0   (0.0-0.1)  K/uL


 


Nucleated RBC %   0.0   /100WBC


 


Nucleated RBCs #   0   K/uL


 


Sodium    137  (136-148)  mmol/L


 


Potassium    5.2 H  (3.5-5.1)  mmol/L


 


Chloride    103  ()  mmol/L


 


Carbon Dioxide    18.8 L  (21.0-32.0)  mmol/L


 


BUN    40 H  (7.0-18.0)  mg/dL


 


Creatinine    3.1 H  (0.8-1.3)  mg/dL


 


Est Cr Clr Drug Dosing    18.04  mL/min


 


Estimated GFR (MDRD)    19.9  ml/min


 


Glucose    114 H  ()  mg/dL


 


Calcium    8.8  (8.5-10.1)  mg/dL


 


Magnesium    2.4 H  (1.5-2.0)  mg/dL


 


Total Bilirubin    1.1 H  (0.2-1.0)  mg/dL


 


AST    1288 H  (15-37)  IU/L


 


ALT    787 H  (14-63)  IU/L


 


Alkaline Phosphatase    204 H  ()  U/L


 


Total Protein    5.8 L  (6.4-8.2)  g/dL


 


Albumin    2.8 L  (3.4-5.0)  g/dL


 


Globulin    3.0  (2.0-3.5)  g/dL


 


Albumin/Globulin Ratio    0.9 L  (1.3-2.8)  


 


Urine Color  YELLOW    


 


Urine Appearance  CLOUDY    


 


Urine pH  5.5    (5.0-8.0)  


 


Ur Specific Gravity  >= 1.030    (1.001-1.035)  


 


Urine Protein  >=300    (NEGATIVE)  mg/dL


 


Urine Glucose (UA)  NEGATIVE    (NEGATIVE)  mg/dL


 


Urine Ketones  NEGATIVE    (NEGATIVE)  mg/dL


 


Urine Occult Blood  LARGE H    (NEGATIVE)  


 


Urine Nitrite  NEGATIVE    (NEGATIVE)  


 


Urine Bilirubin  SMALL H    (NEGATIVE)  


 


Urine Ictotest  NEGATIVE    


 


Urine Urobilinogen  0.2    (<2.0)  EU/dL


 


Ur Leukocyte Esterase  NEGATIVE    (NEGATIVE)  


 


Urine RBC  3-5    (0-2/HPF)  


 


Urine WBC  0-1    (0-5/HPF)  


 


Ur Epithelial Cells  RARE    (NONE-FEW)  


 


Amorphous Sediment  MODERATE    (NEGATIVE)  


 


Urine Bacteria  2+ H    (NEGATIVE)  











Med Orders - Current: 


 Current Medications





Albuterol (Proventil Neb Soln)  2.5 mg NEB Q2H PRN


   PRN Reason: Shortness Of Breath/wheezing


Carvedilol (Coreg)  3.125 mg PO BID [facility]


   Last Admin: 05/05/18 08:45 Dose:  3.125 mg


Clonazepam (Klonopin)  0.5 mg PO BEDTIME [facility]


   Last Admin: 05/05/18 01:34 Dose:  0.5 mg


Diltiazem HCl (Diltiazem)  10 mg IVPUSH Q3H PRN


   PRN Reason: afib HR>110


   Last Admin: 05/05/18 00:41 Dose:  10 mg


Sodium Chloride (Normal Saline)  1,000 mls @ 75 mls/hr IV ASDIRECTED [facility]


   Last Admin: 05/05/18 07:51 Dose:  75 mls/hr


Ceftriaxone Sodium 1,000 mg/ (Sodium Chloride)  50 mls @ 100 mls/hr IV Q24H [facility]IRA


Vancomycin HCl 1 gm/ Sodium (Chloride)  250 mls @ 166.667 mls/hr IV Q24H SHIRA


Ondansetron HCl (Zofran)  4 mg IVPUSH Q4H PRN


   PRN Reason: Nausea


   Last Admin: 05/04/18 18:10 Dose:  4 mg


Oxycodone HCl (Oxycodone)  5 mg PO Q4H PRN


   PRN Reason: Pain


   Last Admin: 05/05/18 08:44 Dose:  5 mg


Pantoprazole Sodium (Protonix***)  40 mg PO DAILY@0700 [facility]


   Last Admin: 05/05/18 06:12 Dose:  40 mg


Sodium Chloride (Saline Flush)  2.5 ml FLUSH ASDIRECTED PRN


   PRN Reason: Keep Vein Open


Vancomycin HCl (Pharmacy To Dose - Vancomycin)  1 dose .XX ASDIRECTED [facility]





Discontinued Medications





Clonazepam (Klonopin)  0.5 mg PO ONCALL ONE


   Stop: 05/04/18 09:14


   Last Admin: 05/04/18 09:58 Dose:  0.5 mg


Diltiazem HCl (Diltiazem)  10 mg IVPUSH ONETIME ONE


   Stop: 05/03/18 17:19


   Last Admin: 05/03/18 17:38 Dose:  10 mg


Diltiazem HCl (Diltiazem)  10 mg IVPUSH Q6H PRN


   PRN Reason: afib HR>110


Potassium Chloride 40 meq/ (Sodium Chloride)  520 mls @ 100 mls/hr IV .ONETIME 

[facility]


   Last Admin: 05/03/18 18:20 Dose:  100 mls/hr


Magnesium Sulfate 2 gm/ Premix  50 mls @ 50 mls/hr IV ONETIME ONE


   Stop: 05/03/18 19:08


   Last Admin: 05/03/18 18:21 Dose:  50 mls/hr


Ceftriaxone Sodium/Dextrose 1 (gm/ Premix)  50 mls @ 100 mls/hr IV Q24H [facility]


   Last Admin: 05/05/18 03:03 Dose:  Not Given


Ceftriaxone Sodium 1 gm/ (Sodium Chloride)  50 mls @ 100 mls/hr IV Q24H [facility]


   Last Admin: 05/05/18 03:17 Dose:  100 mls/hr


Metoprolol Tartrate (Lopressor)  5 mg IVPUSH STAT ONE


   Stop: 05/03/18 16:59


   Last Admin: 05/03/18 17:38 Dose:  Not Given


Metoprolol Tartrate (Lopressor)  25 mg PO Q12HR [facility]


   Last Admin: 05/03/18 17:39 Dose:  Not Given


Oxycodone HCl (Oxycodone)  5 mg PO Q6H PRN


   PRN Reason: Pain


   Last Admin: 05/04/18 20:08 Dose:  5 mg


Potassium Chloride (Klor-Con M20)  40 meq PO BID [facility]


   Last Admin: 05/05/18 09:21 Dose:  Not Given

## 2024-06-18 NOTE — CT
EXAM DATE: 18



PATIENT'S AGE: 72



Patient: FELICE CRESPO



Facility: [place], [place]

Patient ID: [id number]

Site Patient ID: [id number].

Site Accession #: [id number]

: 1945

Study: CT Chest [id number]/3/2018 7:35:13 PM

Ordering Physician: Duke Hamlin



Final Report: 

INDICATION: Dyspnea, pleural effusion



TECHNIQUE: CT chest without i.v. contrast. Coronal and sagittal reformats were 
obtained.



CONTRAST: None



COMPARISON: None



FINDINGS: 



Cardiovascular: Mild cardiomegaly is noted with moderate enlargement of the 
right atrium. Severe atherosclerotic calcifications are noted in the coronary 
arteries. Previous median sternotomy and placement of a left atrial appendage 
ligation clip. The pulmonary arteries are unremarkable in appearance. No sign 
of aneurysm seen in the thoracic aorta. The presence of aortic dissection 
cannot be evaluated without the use of intravenous contrast. 



Mediastinum: No mass or adenopathy seen. 



Lungs: Compressive atelectasis of most of the left lower lobe and lateral 
lingula is noted. Linear scarring is seen within the right middle lobe. Mild 
passive atelectasis is present in the right lung base. 



Pleura and pericardium: There is a large left and small right pleural effusion 
present. No significant pericardial effusion is present. 



Chest wall and axilla: No mass or adenopathy seen. 



Bones: Unremarkable for age. 



Upper abdomen: Unremarkable. 



IMPRESSION: 

1. There is a large left and small right pleural effusion present causing 
compressive atelectasis of most of the left lower lobe and lateral lingula.



Dictated by Saul Lux MD @ 5/3/2018 7:54:50 PM



Please note that all CT scans at this facility use dose modulation, iterative 
reconstruction, and/or weight-based dosing when appropriate to reduce radiation 
dose to as low as reasonably achievable.



Dictated by: Saul Lux MD @ 2018 19:54:54

(Electronic Signature)





Report Signed by Proxy.
[facility]